# Patient Record
Sex: FEMALE | Race: ASIAN | NOT HISPANIC OR LATINO | Employment: OTHER | URBAN - METROPOLITAN AREA
[De-identification: names, ages, dates, MRNs, and addresses within clinical notes are randomized per-mention and may not be internally consistent; named-entity substitution may affect disease eponyms.]

---

## 2017-07-09 ENCOUNTER — HOSPITAL ENCOUNTER (OUTPATIENT)
Facility: MEDICAL CENTER | Age: 82
End: 2017-07-11
Attending: EMERGENCY MEDICINE | Admitting: INTERNAL MEDICINE
Payer: MEDICARE

## 2017-07-09 ENCOUNTER — APPOINTMENT (OUTPATIENT)
Dept: RADIOLOGY | Facility: MEDICAL CENTER | Age: 82
End: 2017-07-09
Attending: EMERGENCY MEDICINE
Payer: MEDICARE

## 2017-07-09 ENCOUNTER — RESOLUTE PROFESSIONAL BILLING HOSPITAL PROF FEE (OUTPATIENT)
Dept: HOSPITALIST | Facility: MEDICAL CENTER | Age: 82
End: 2017-07-09
Payer: MEDICARE

## 2017-07-09 DIAGNOSIS — R73.9 HYPERGLYCEMIA: ICD-10-CM

## 2017-07-09 DIAGNOSIS — R11.2 NAUSEA AND VOMITING, INTRACTABILITY OF VOMITING NOT SPECIFIED, UNSPECIFIED VOMITING TYPE: ICD-10-CM

## 2017-07-09 DIAGNOSIS — R41.82 ALTERED MENTAL STATUS, UNSPECIFIED ALTERED MENTAL STATUS TYPE: ICD-10-CM

## 2017-07-09 PROBLEM — R27.0 ATAXIA: Status: ACTIVE | Noted: 2017-07-09

## 2017-07-09 PROBLEM — R41.0 CONFUSION: Status: ACTIVE | Noted: 2017-07-09

## 2017-07-09 LAB
ALBUMIN SERPL BCP-MCNC: 4 G/DL (ref 3.2–4.9)
ALBUMIN/GLOB SERPL: 1.2 G/DL
ALP SERPL-CCNC: 71 U/L (ref 30–99)
ALT SERPL-CCNC: 16 U/L (ref 2–50)
ANION GAP SERPL CALC-SCNC: 10 MMOL/L (ref 0–11.9)
APPEARANCE UR: CLEAR
APTT PPP: 24.3 SEC (ref 24.7–36)
AST SERPL-CCNC: 14 U/L (ref 12–45)
BASOPHILS # BLD AUTO: 1.1 % (ref 0–1.8)
BASOPHILS # BLD: 0.11 K/UL (ref 0–0.12)
BILIRUB SERPL-MCNC: 0.6 MG/DL (ref 0.1–1.5)
BILIRUB UR QL STRIP.AUTO: NEGATIVE
BNP SERPL-MCNC: 525 PG/ML (ref 0–100)
BUN SERPL-MCNC: 34 MG/DL (ref 8–22)
CALCIUM SERPL-MCNC: 9 MG/DL (ref 8.5–10.5)
CHLORIDE SERPL-SCNC: 106 MMOL/L (ref 96–112)
CO2 SERPL-SCNC: 22 MMOL/L (ref 20–33)
COLOR UR: YELLOW
CREAT SERPL-MCNC: 1.02 MG/DL (ref 0.5–1.4)
CULTURE IF INDICATED INDCX: NO UA CULTURE
EOSINOPHIL # BLD AUTO: 0.26 K/UL (ref 0–0.51)
EOSINOPHIL NFR BLD: 2.5 % (ref 0–6.9)
ERYTHROCYTE [DISTWIDTH] IN BLOOD BY AUTOMATED COUNT: 46.6 FL (ref 35.9–50)
EST. AVERAGE GLUCOSE BLD GHB EST-MCNC: 177 MG/DL
GFR SERPL CREATININE-BSD FRML MDRD: 52 ML/MIN/1.73 M 2
GLOBULIN SER CALC-MCNC: 3.3 G/DL (ref 1.9–3.5)
GLUCOSE BLD-MCNC: 200 MG/DL (ref 65–99)
GLUCOSE BLD-MCNC: 220 MG/DL (ref 65–99)
GLUCOSE BLD-MCNC: 287 MG/DL (ref 65–99)
GLUCOSE SERPL-MCNC: 260 MG/DL (ref 65–99)
GLUCOSE UR STRIP.AUTO-MCNC: NEGATIVE MG/DL
HBA1C MFR BLD: 7.8 % (ref 0–5.6)
HCT VFR BLD AUTO: 33.3 % (ref 37–47)
HGB BLD-MCNC: 10.8 G/DL (ref 12–16)
IMM GRANULOCYTES # BLD AUTO: 0.03 K/UL (ref 0–0.11)
IMM GRANULOCYTES NFR BLD AUTO: 0.3 % (ref 0–0.9)
INR PPP: 0.9 (ref 0.87–1.13)
KETONES UR STRIP.AUTO-MCNC: NEGATIVE MG/DL
LEUKOCYTE ESTERASE UR QL STRIP.AUTO: NEGATIVE
LIPASE SERPL-CCNC: 20 U/L (ref 11–82)
LYMPHOCYTES # BLD AUTO: 2.12 K/UL (ref 1–4.8)
LYMPHOCYTES NFR BLD: 20.4 % (ref 22–41)
MCH RBC QN AUTO: 29 PG (ref 27–33)
MCHC RBC AUTO-ENTMCNC: 32.4 G/DL (ref 33.6–35)
MCV RBC AUTO: 89.5 FL (ref 81.4–97.8)
MICRO URNS: NORMAL
MONOCYTES # BLD AUTO: 0.57 K/UL (ref 0–0.85)
MONOCYTES NFR BLD AUTO: 5.5 % (ref 0–13.4)
NEUTROPHILS # BLD AUTO: 7.32 K/UL (ref 2–7.15)
NEUTROPHILS NFR BLD: 70.2 % (ref 44–72)
NITRITE UR QL STRIP.AUTO: NEGATIVE
NRBC # BLD AUTO: 0 K/UL
NRBC BLD AUTO-RTO: 0 /100 WBC
PH UR STRIP.AUTO: 6 [PH]
PLATELET # BLD AUTO: 207 K/UL (ref 164–446)
PMV BLD AUTO: 10.6 FL (ref 9–12.9)
POTASSIUM SERPL-SCNC: 4.3 MMOL/L (ref 3.6–5.5)
PROT SERPL-MCNC: 7.3 G/DL (ref 6–8.2)
PROT UR QL STRIP: NEGATIVE MG/DL
PROTHROMBIN TIME: 12.4 SEC (ref 12–14.6)
RBC # BLD AUTO: 3.72 M/UL (ref 4.2–5.4)
RBC UR QL AUTO: NEGATIVE
SODIUM SERPL-SCNC: 138 MMOL/L (ref 135–145)
SP GR UR STRIP.AUTO: 1.01
TROPONIN I SERPL-MCNC: <0.01 NG/ML (ref 0–0.04)
TROPONIN I SERPL-MCNC: <0.01 NG/ML (ref 0–0.04)
WBC # BLD AUTO: 10.4 K/UL (ref 4.8–10.8)

## 2017-07-09 PROCEDURE — 82962 GLUCOSE BLOOD TEST: CPT

## 2017-07-09 PROCEDURE — 84484 ASSAY OF TROPONIN QUANT: CPT | Mod: 91

## 2017-07-09 PROCEDURE — 96361 HYDRATE IV INFUSION ADD-ON: CPT | Mod: XU

## 2017-07-09 PROCEDURE — G0378 HOSPITAL OBSERVATION PER HR: HCPCS

## 2017-07-09 PROCEDURE — 70450 CT HEAD/BRAIN W/O DYE: CPT

## 2017-07-09 PROCEDURE — 700117 HCHG RX CONTRAST REV CODE 255: Performed by: EMERGENCY MEDICINE

## 2017-07-09 PROCEDURE — 85610 PROTHROMBIN TIME: CPT

## 2017-07-09 PROCEDURE — 36415 COLL VENOUS BLD VENIPUNCTURE: CPT

## 2017-07-09 PROCEDURE — A9270 NON-COVERED ITEM OR SERVICE: HCPCS | Performed by: INTERNAL MEDICINE

## 2017-07-09 PROCEDURE — 85025 COMPLETE CBC W/AUTO DIFF WBC: CPT

## 2017-07-09 PROCEDURE — 700105 HCHG RX REV CODE 258: Performed by: INTERNAL MEDICINE

## 2017-07-09 PROCEDURE — 700102 HCHG RX REV CODE 250 W/ 637 OVERRIDE(OP): Performed by: INTERNAL MEDICINE

## 2017-07-09 PROCEDURE — 80053 COMPREHEN METABOLIC PANEL: CPT

## 2017-07-09 PROCEDURE — 71010 DX-CHEST-PORTABLE (1 VIEW): CPT

## 2017-07-09 PROCEDURE — 303105 HCHG CATHETER EXTRA

## 2017-07-09 PROCEDURE — 81003 URINALYSIS AUTO W/O SCOPE: CPT

## 2017-07-09 PROCEDURE — 99220 PR INITIAL OBSERVATION CARE,LEVL III: CPT | Performed by: INTERNAL MEDICINE

## 2017-07-09 PROCEDURE — 96375 TX/PRO/DX INJ NEW DRUG ADDON: CPT | Mod: XU

## 2017-07-09 PROCEDURE — 83880 ASSAY OF NATRIURETIC PEPTIDE: CPT

## 2017-07-09 PROCEDURE — 96374 THER/PROPH/DIAG INJ IV PUSH: CPT | Mod: XU

## 2017-07-09 PROCEDURE — 85730 THROMBOPLASTIN TIME PARTIAL: CPT

## 2017-07-09 PROCEDURE — 99285 EMERGENCY DEPT VISIT HI MDM: CPT

## 2017-07-09 PROCEDURE — 83036 HEMOGLOBIN GLYCOSYLATED A1C: CPT

## 2017-07-09 PROCEDURE — 83690 ASSAY OF LIPASE: CPT

## 2017-07-09 PROCEDURE — 71275 CT ANGIOGRAPHY CHEST: CPT

## 2017-07-09 PROCEDURE — 94760 N-INVAS EAR/PLS OXIMETRY 1: CPT

## 2017-07-09 PROCEDURE — 93005 ELECTROCARDIOGRAM TRACING: CPT | Performed by: EMERGENCY MEDICINE

## 2017-07-09 PROCEDURE — 700105 HCHG RX REV CODE 258: Performed by: EMERGENCY MEDICINE

## 2017-07-09 PROCEDURE — 74177 CT ABD & PELVIS W/CONTRAST: CPT

## 2017-07-09 PROCEDURE — 700111 HCHG RX REV CODE 636 W/ 250 OVERRIDE (IP): Mod: JW | Performed by: EMERGENCY MEDICINE

## 2017-07-09 PROCEDURE — 51702 INSERT TEMP BLADDER CATH: CPT

## 2017-07-09 PROCEDURE — 87040 BLOOD CULTURE FOR BACTERIA: CPT | Mod: 91

## 2017-07-09 RX ORDER — PANTOPRAZOLE SODIUM 20 MG/1
20 TABLET, DELAYED RELEASE ORAL DAILY
Status: DISCONTINUED | OUTPATIENT
Start: 2017-07-09 | End: 2017-07-09

## 2017-07-09 RX ORDER — NITROGLYCERIN 0.1MG/HR
1 PATCH, TRANSDERMAL 24 HOURS TRANSDERMAL DAILY
Status: DISCONTINUED | OUTPATIENT
Start: 2017-07-09 | End: 2017-07-09

## 2017-07-09 RX ORDER — CARVEDILOL 25 MG/1
25 TABLET ORAL 2 TIMES DAILY WITH MEALS
COMMUNITY

## 2017-07-09 RX ORDER — AMOXICILLIN 250 MG
2 CAPSULE ORAL 2 TIMES DAILY
Status: DISCONTINUED | OUTPATIENT
Start: 2017-07-10 | End: 2017-07-11 | Stop reason: HOSPADM

## 2017-07-09 RX ORDER — TRAZODONE HYDROCHLORIDE 50 MG/1
150 TABLET ORAL NIGHTLY
Status: DISCONTINUED | OUTPATIENT
Start: 2017-07-09 | End: 2017-07-09

## 2017-07-09 RX ORDER — OMEPRAZOLE 20 MG/1
20 CAPSULE, DELAYED RELEASE ORAL DAILY
Status: DISCONTINUED | OUTPATIENT
Start: 2017-07-10 | End: 2017-07-11 | Stop reason: HOSPADM

## 2017-07-09 RX ORDER — BISACODYL 10 MG
10 SUPPOSITORY, RECTAL RECTAL
Status: DISCONTINUED | OUTPATIENT
Start: 2017-07-09 | End: 2017-07-11 | Stop reason: HOSPADM

## 2017-07-09 RX ORDER — ONDANSETRON 2 MG/ML
4 INJECTION INTRAMUSCULAR; INTRAVENOUS ONCE
Status: COMPLETED | OUTPATIENT
Start: 2017-07-09 | End: 2017-07-09

## 2017-07-09 RX ORDER — DIPHENHYDRAMINE HYDROCHLORIDE 50 MG/ML
INJECTION INTRAMUSCULAR; INTRAVENOUS
Status: DISPENSED
Start: 2017-07-09 | End: 2017-07-09

## 2017-07-09 RX ORDER — GABAPENTIN 600 MG/1
600 TABLET ORAL 3 TIMES DAILY
COMMUNITY

## 2017-07-09 RX ORDER — MIDAZOLAM HYDROCHLORIDE 1 MG/ML
2 INJECTION INTRAMUSCULAR; INTRAVENOUS ONCE
Status: DISCONTINUED | OUTPATIENT
Start: 2017-07-09 | End: 2017-07-09

## 2017-07-09 RX ORDER — ISOSORBIDE MONONITRATE 30 MG/1
30 TABLET, EXTENDED RELEASE ORAL EVERY MORNING
COMMUNITY

## 2017-07-09 RX ORDER — SODIUM CHLORIDE 9 MG/ML
INJECTION, SOLUTION INTRAVENOUS CONTINUOUS
Status: DISCONTINUED | OUTPATIENT
Start: 2017-07-09 | End: 2017-07-11 | Stop reason: HOSPADM

## 2017-07-09 RX ORDER — ASPIRIN 325 MG
325 TABLET ORAL DAILY
Status: DISCONTINUED | OUTPATIENT
Start: 2017-07-09 | End: 2017-07-11 | Stop reason: HOSPADM

## 2017-07-09 RX ORDER — ASPIRIN 300 MG/1
300 SUPPOSITORY RECTAL DAILY
Status: DISCONTINUED | OUTPATIENT
Start: 2017-07-09 | End: 2017-07-11 | Stop reason: HOSPADM

## 2017-07-09 RX ORDER — GLIPIZIDE 5 MG/1
10 TABLET ORAL EVERY MORNING
Status: DISCONTINUED | OUTPATIENT
Start: 2017-07-10 | End: 2017-07-11 | Stop reason: HOSPADM

## 2017-07-09 RX ORDER — NITROGLYCERIN 0.4 MG/1
0.4 TABLET SUBLINGUAL PRN
Status: DISCONTINUED | OUTPATIENT
Start: 2017-07-09 | End: 2017-07-09

## 2017-07-09 RX ORDER — CLONAZEPAM 0.5 MG/1
0.5 TABLET ORAL ONCE
Status: COMPLETED | OUTPATIENT
Start: 2017-07-09 | End: 2017-07-09

## 2017-07-09 RX ORDER — DEXTROSE MONOHYDRATE 25 G/50ML
25 INJECTION, SOLUTION INTRAVENOUS
Status: DISCONTINUED | OUTPATIENT
Start: 2017-07-09 | End: 2017-07-11 | Stop reason: HOSPADM

## 2017-07-09 RX ORDER — HYDRALAZINE HYDROCHLORIDE 10 MG/1
30 TABLET, FILM COATED ORAL 3 TIMES DAILY
Status: DISCONTINUED | OUTPATIENT
Start: 2017-07-10 | End: 2017-07-11 | Stop reason: HOSPADM

## 2017-07-09 RX ORDER — DEXAMETHASONE SODIUM PHOSPHATE 10 MG/ML
10 INJECTION, SOLUTION INTRAMUSCULAR; INTRAVENOUS ONCE
Status: COMPLETED | OUTPATIENT
Start: 2017-07-09 | End: 2017-07-09

## 2017-07-09 RX ORDER — CLOPIDOGREL BISULFATE 75 MG/1
75 TABLET ORAL DAILY
Status: DISCONTINUED | OUTPATIENT
Start: 2017-07-09 | End: 2017-07-09

## 2017-07-09 RX ORDER — FUROSEMIDE 10 MG/ML
20 INJECTION INTRAMUSCULAR; INTRAVENOUS ONCE
Status: COMPLETED | OUTPATIENT
Start: 2017-07-09 | End: 2017-07-09

## 2017-07-09 RX ORDER — LEVALBUTEROL TARTRATE 45 UG/1
1-2 AEROSOL, METERED ORAL EVERY 4 HOURS PRN
Status: DISCONTINUED | OUTPATIENT
Start: 2017-07-09 | End: 2017-07-09

## 2017-07-09 RX ORDER — ACETAMINOPHEN 325 MG/1
650 TABLET ORAL EVERY 6 HOURS PRN
Status: DISCONTINUED | OUTPATIENT
Start: 2017-07-09 | End: 2017-07-11 | Stop reason: HOSPADM

## 2017-07-09 RX ORDER — POLYETHYLENE GLYCOL 3350 17 G/17G
1 POWDER, FOR SOLUTION ORAL
Status: DISCONTINUED | OUTPATIENT
Start: 2017-07-09 | End: 2017-07-11 | Stop reason: HOSPADM

## 2017-07-09 RX ORDER — ATORVASTATIN CALCIUM 20 MG/1
20 TABLET, FILM COATED ORAL NIGHTLY
Status: DISCONTINUED | OUTPATIENT
Start: 2017-07-09 | End: 2017-07-09

## 2017-07-09 RX ORDER — HYDRALAZINE HYDROCHLORIDE 10 MG/1
30 TABLET, FILM COATED ORAL 3 TIMES DAILY
COMMUNITY

## 2017-07-09 RX ORDER — PANTOPRAZOLE SODIUM 20 MG/1
20 TABLET, DELAYED RELEASE ORAL DAILY
COMMUNITY

## 2017-07-09 RX ORDER — ISOSORBIDE MONONITRATE 60 MG/1
30 TABLET, EXTENDED RELEASE ORAL EVERY MORNING
Status: DISCONTINUED | OUTPATIENT
Start: 2017-07-10 | End: 2017-07-11 | Stop reason: HOSPADM

## 2017-07-09 RX ORDER — ASPIRIN 81 MG/1
81 TABLET, CHEWABLE ORAL DAILY
COMMUNITY

## 2017-07-09 RX ORDER — DIPHENHYDRAMINE HYDROCHLORIDE 50 MG/ML
25 INJECTION INTRAMUSCULAR; INTRAVENOUS ONCE
Status: COMPLETED | OUTPATIENT
Start: 2017-07-09 | End: 2017-07-09

## 2017-07-09 RX ORDER — GLIPIZIDE 5 MG/1
5 TABLET ORAL EVERY EVENING
Status: DISCONTINUED | OUTPATIENT
Start: 2017-07-10 | End: 2017-07-11 | Stop reason: HOSPADM

## 2017-07-09 RX ORDER — ONDANSETRON 2 MG/ML
4 INJECTION INTRAMUSCULAR; INTRAVENOUS ONCE
Status: ACTIVE | OUTPATIENT
Start: 2017-07-09 | End: 2017-07-10

## 2017-07-09 RX ORDER — ATORVASTATIN CALCIUM 40 MG/1
10 TABLET, FILM COATED ORAL DAILY
COMMUNITY

## 2017-07-09 RX ORDER — ASPIRIN 81 MG/1
324 TABLET, CHEWABLE ORAL DAILY
Status: DISCONTINUED | OUTPATIENT
Start: 2017-07-09 | End: 2017-07-11 | Stop reason: HOSPADM

## 2017-07-09 RX ORDER — ATORVASTATIN CALCIUM 80 MG/1
80 TABLET, FILM COATED ORAL
Status: DISCONTINUED | OUTPATIENT
Start: 2017-07-09 | End: 2017-07-11 | Stop reason: HOSPADM

## 2017-07-09 RX ORDER — ASPIRIN 81 MG/1
81 TABLET, CHEWABLE ORAL DAILY
Status: DISCONTINUED | OUTPATIENT
Start: 2017-07-09 | End: 2017-07-09

## 2017-07-09 RX ORDER — HALOPERIDOL 5 MG/ML
2 INJECTION INTRAMUSCULAR ONCE
Status: DISCONTINUED | OUTPATIENT
Start: 2017-07-09 | End: 2017-07-09

## 2017-07-09 RX ORDER — CARVEDILOL 6.25 MG/1
25 TABLET ORAL 2 TIMES DAILY WITH MEALS
Status: DISCONTINUED | OUTPATIENT
Start: 2017-07-09 | End: 2017-07-11 | Stop reason: HOSPADM

## 2017-07-09 RX ORDER — BACLOFEN 10 MG/1
10 TABLET ORAL 3 TIMES DAILY
Status: DISCONTINUED | OUTPATIENT
Start: 2017-07-09 | End: 2017-07-11 | Stop reason: HOSPADM

## 2017-07-09 RX ORDER — SODIUM CHLORIDE 9 MG/ML
1000 INJECTION, SOLUTION INTRAVENOUS ONCE
Status: COMPLETED | OUTPATIENT
Start: 2017-07-09 | End: 2017-07-09

## 2017-07-09 RX ORDER — BACLOFEN 10 MG/1
10 TABLET ORAL 3 TIMES DAILY
COMMUNITY

## 2017-07-09 RX ORDER — METOPROLOL TARTRATE 50 MG/1
50 TABLET, FILM COATED ORAL 2 TIMES DAILY
Status: DISCONTINUED | OUTPATIENT
Start: 2017-07-09 | End: 2017-07-09

## 2017-07-09 RX ADMIN — SODIUM CHLORIDE: 9 INJECTION, SOLUTION INTRAVENOUS at 18:55

## 2017-07-09 RX ADMIN — ACETAMINOPHEN 650 MG: 325 TABLET, FILM COATED ORAL at 19:24

## 2017-07-09 RX ADMIN — INSULIN LISPRO 3 UNITS: 100 INJECTION, SOLUTION INTRAVENOUS; SUBCUTANEOUS at 18:37

## 2017-07-09 RX ADMIN — INSULIN LISPRO 1 UNITS: 100 INJECTION, SOLUTION INTRAVENOUS; SUBCUTANEOUS at 21:05

## 2017-07-09 RX ADMIN — FUROSEMIDE 20 MG: 10 INJECTION, SOLUTION INTRAMUSCULAR; INTRAVENOUS at 12:19

## 2017-07-09 RX ADMIN — ONDANSETRON 4 MG: 2 INJECTION INTRAMUSCULAR; INTRAVENOUS at 10:10

## 2017-07-09 RX ADMIN — DIPHENHYDRAMINE HYDROCHLORIDE 25 MG: 50 INJECTION, SOLUTION INTRAMUSCULAR; INTRAVENOUS at 10:10

## 2017-07-09 RX ADMIN — CLONAZEPAM 0.5 MG: 0.5 TABLET ORAL at 20:58

## 2017-07-09 RX ADMIN — SODIUM CHLORIDE 1000 ML: 900 INJECTION, SOLUTION INTRAVENOUS at 10:10

## 2017-07-09 RX ADMIN — IOHEXOL 100 ML: 350 INJECTION, SOLUTION INTRAVENOUS at 11:44

## 2017-07-09 RX ADMIN — DEXAMETHASONE SODIUM PHOSPHATE 10 MG: 10 INJECTION, SOLUTION INTRAMUSCULAR; INTRAVENOUS at 10:29

## 2017-07-09 RX ADMIN — BACLOFEN 10 MG: 10 TABLET ORAL at 22:31

## 2017-07-09 RX ADMIN — CARVEDILOL 25 MG: 6.25 TABLET, FILM COATED ORAL at 18:55

## 2017-07-09 RX ADMIN — ATORVASTATIN CALCIUM 80 MG: 80 TABLET, FILM COATED ORAL at 19:24

## 2017-07-09 ASSESSMENT — COPD QUESTIONNAIRES
HAVE YOU SMOKED AT LEAST 100 CIGARETTES IN YOUR ENTIRE LIFE: NO/DON'T KNOW
DO YOU EVER COUGH UP ANY MUCUS OR PHLEGM?: NO/ONLY WITH OCCASIONAL COLDS OR INFECTIONS
COPD SCREENING SCORE: 2
DURING THE PAST 4 WEEKS HOW MUCH DID YOU FEEL SHORT OF BREATH: NONE/LITTLE OF THE TIME

## 2017-07-09 ASSESSMENT — LIFESTYLE VARIABLES
ALCOHOL_USE: NO
EVER_SMOKED: NEVER
EVER_SMOKED: NEVER
DO YOU DRINK ALCOHOL: NO

## 2017-07-09 ASSESSMENT — PAIN SCALES - GENERAL: PAINLEVEL_OUTOF10: 0

## 2017-07-09 NOTE — IP AVS SNAPSHOT
Home Care Instructions                                                                                                                  Name:Yi Nix  Medical Record Number:[id number]  CSN: [id number]    YOB: 1935   Age: 82 y.o.  Sex: female  HT:1.524 m (5') WT: 63.504 kg (140 lb)          Admit Date: 7/9/2017     Discharge Date:   Today's Date: 7/11/2017  Attending Doctor:  Mitchell Dawkins M.D.                  Allergies:  Phenergan with dextromethorphan; Food; Codeine; Contrast media with iodine; Lactose; Sulfa drugs; and Vicodin            Discharge Instructions       Discharge Instructions    Discharged to home by car with relative. Discharged via wheelchair, hospital escort: Yes.  Special equipment needed: Not Applicable    Be sure to schedule a follow-up appointment with your primary care doctor or any specialists as instructed.     Discharge Plan:   Influenza Vaccine Indication: Not indicated: Previously immunized this influenza season and > 8 years of age    I understand that a diet low in cholesterol, fat, and sodium is recommended for good health. Unless I have been given specific instructions below for another diet, I accept this instruction as my diet prescription.   Other diet: Diabetic      Special Instructions: None    · Is patient discharged on Warfarin / Coumadin?   No     · Is patient Post Blood Transfusion?  No    Depression / Suicide Risk    As you are discharged from this [facility] facility, it is important to learn how to keep safe from harming yourself.    Recognize the warning signs:  · Abrupt changes in personality, positive or negative- including increase in energy   · Giving away possessions  · Change in eating patterns- significant weight changes-  positive or negative  · Change in sleeping patterns- unable to sleep or sleeping all the time   · Unwillingness or inability to communicate  · Depression  · Unusual sadness, discouragement and loneliness  · Talk of wanting to  die  · Neglect of personal appearance   · Rebelliousness- reckless behavior  · Withdrawal from people/activities they love  · Confusion- inability to concentrate     If you or a loved one observes any of these behaviors or has concerns about self-harm, here's what you can do:  · Talk about it- your feelings and reasons for harming yourself  · Remove any means that you might use to hurt yourself (examples: pills, rope, extension cords, firearm)  · Get professional help from the community (Mental Health, Substance Abuse, psychological counseling)  · Do not be alone:Call your Safe Contact- someone whom you trust who will be there for you.  · Call your local CRISIS HOTLINE 122-0572 or 576-852-3976  · Call your local Children's Mobile Crisis Response Team [place] (178) 224-2371 or www.[employer]  · Call the toll free National Suicide Prevention Hotlines   · National Suicide Prevention Lifeline 024-544-FUVM (4099)  · [place] [facility] 800-SUICIDE (863-5345)           Discharge Medication Instructions:    Below are the medications your physician expects you to take upon discharge:    Review all your home medications and newly ordered medications with your doctor and/or pharmacist. Follow medication instructions as directed by your doctor and/or pharmacist.    Please keep your medication list with you and share with your physician.               Medication List      START taking these medications        Instructions    Morning Afternoon Evening Bedtime    ondansetron 4 MG Tbdp   Commonly known as:  ZOFRAN ODT   Next Dose Due:  7/11/17 11pm        Take 1 Tab by mouth every 8 hours as needed for Nausea/Vomiting for up to 5 days.   Dose:  4 mg                          CONTINUE taking these medications        Instructions    Morning Afternoon Evening Bedtime    aspirin 81 MG Chew chewable tablet   Commonly known as:  ASA   Next Dose Due:  7/12/17        Take 81 mg by mouth every day.   Dose:  81 mg                         atorvastatin 40 MG Tabs   Last time this was given:  80 mg on 7/10/2017  8:04 PM   Commonly known as:  LIPITOR   Next Dose Due:  7/11/17 9pm        Take 10 mg by mouth every day.   Dose:  10 mg                        baclofen 10 MG Tabs   Last time this was given:  10 mg on 7/11/2017  8:55 AM   Commonly known as:  LIORESAL   Next Dose Due:  7/11/17 9pm        Take 10 mg by mouth 3 times a day.   Dose:  10 mg                        carvedilol 25 MG Tabs   Last time this was given:  25 mg on 7/11/2017  8:55 AM   Commonly known as:  COREG   Next Dose Due:  7/11/17 5pm          Take 25 mg by mouth 2 times a day, with meals.   Dose:  25 mg                        coenzyme Q-10 30 MG capsule   Next Dose Due:  7/11/17        Take 30 mg by mouth every day.   Dose:  30 mg                        gabapentin 600 MG tablet   Commonly known as:  NEURONTIN   Next Dose Due:  7/11/17 9pm          Take 600 mg by mouth 3 times a day.   Dose:  600 mg                        glipiZIDE 10 MG Tabs   Last time this was given:  10 mg on 7/11/2017  8:55 AM   Commonly known as:  GLUCOTROL   Next Dose Due:  7/11/17 5pm        Take 5-10 mg by mouth 2 times a day. 10 mg AM 5 mg PM   Dose:  5-10 mg                        hydrALAZINE 10 MG Tabs   Last time this was given:  30 mg on 7/11/2017  6:11 AM   Commonly known as:  APRESOLINE   Next Dose Due:  711/17 9pm          Take 30 mg by mouth 3 times a day.   Dose:  30 mg                        isosorbide mononitrate SR 30 MG Tb24   Last time this was given:  30 mg on 7/11/2017  8:55 AM   Commonly known as:  IMDUR   Next Dose Due:  7/12/17        Take 30 mg by mouth every morning.   Dose:  30 mg                        pantoprazole 20 MG tablet   Commonly known as:  PROTONIX   Next Dose Due:  7/12/17        Take 20 mg by mouth every day.   Dose:  20 mg                             Where to Get Your Medications      Information about where to get these medications is not yet available     !  Ask your nurse or doctor about these medications    - ondansetron 4 MG Tbdp            Instructions           Diet / Nutrition:    Follow any diet instructions given to you by your doctor or the dietician, including how much salt (sodium) you are allowed each day.    If you are overweight, talk to your doctor about a weight reduction plan.    Activity:    Remain physically active following your doctor's instructions about exercise and activity.    Rest often.     Any time you become even a little tired or short of breath, SIT DOWN and rest.    Worsening Symptoms:    Report any of the following signs and symptoms to the doctor's office immediately:    *Pain of jaw, arm, or neck  *Chest pain not relieved by medication                               *Dizziness or loss of consciousness  *Difficulty breathing even when at rest   *More tired than usual                                       *Bleeding drainage or swelling of surgical site  *Swelling of feet, ankles, legs or stomach                 *Fever (>100ºF)  *Pink or blood tinged sputum  *Weight gain (3lbs/day or 5lbs /week)           *Shock from internal defibrillator (if applicable)  *Palpitations or irregular heartbeats                *Cool and/or numb extremities    Stroke Awareness    Common Risk Factors for Stroke include:    Age  Atrial Fibrillation  Carotid Artery Stenosis  Diabetes Mellitus  Excessive alcohol consumption  High blood pressure  Overweight   Physical inactivity  Smoking    Warning signs and symptoms of a stroke include:    *Sudden numbness or weakness of the face, arm or leg (especially on one side of the body).  *Sudden confusion, trouble speaking or understanding.  *Sudden trouble seeing in one or both eyes.  *Sudden trouble walking, dizziness, loss of balance or coordination.Sudden severe headache with no known cause.    It is very important to get treatment quickly when a stroke occurs. If you experience any of the above warning signs, call  911 immediately.                   Disclaimer         Quit Smoking / Tobacco Use:    I understand the use of any tobacco products increases my chance of suffering from future heart disease or stroke and could cause other illnesses which may shorten my life. Quitting the use of tobacco products is the single most important thing I can do to improve my health. For further information on smoking / tobacco cessation call a Toll Free Quit Line at 1-964.996.3755 (*National Cancer [place]) or 1-330.677.8675 (American Lung Association) or you can access the web based program at www.lungusa.org.    [place] Tobacco Users Help Line:  (670) 944-3405       Toll Free: 1-328.665.9929  Quit Tobacco Program [facility] (910)227-5040    Crisis Hotline:    [place] Crisis Hotline:  5-706-RHQZIOR or 1-841.906.6552    [place] Crisis Hotline:    1-980.803.7052 or 067-595-3385    Discharge Survey:   Thank you for choosing [facility]. We hope we did everything we could to make your hospital stay a pleasant one. You may be receiving a phone survey and we would appreciate your time and participation in answering the questions. Your input is very valuable to us in our efforts to improve our service to our patients and their families.        My signature on this form indicates that:    1. I have reviewed and understand the above information.  2. My questions regarding this information have been answered to my satisfaction.  3. I have formulated a plan with my discharge nurse to obtain my prescribed medications for home.                  Disclaimer         __________________________________                     __________       ________                       Patient Signature                                                 Date                    Time

## 2017-07-09 NOTE — IP AVS SNAPSHOT
Envoimoinscher Access Code: [email or web address]  Expires: 8/10/2017  2:20 PM    Envoimoinscher  A secure, online tool to manage your health information     [employer] Envoimoinscher® is a secure, online tool that connects you to your personalized health information from the privacy of your home -- day or night - making it very easy for you to manage your healthcare. Once the activation process is completed, you can even access your medical information using the Envoimoinscher cathy, which is available for free in the Apple Cathy store or Google Play store.     Envoimoinscher provides the following levels of access (as shown below):   My Chart Features   [facility] Primary Care Doctor [facility]  Specialists [facility]  Urgent  Care Non-[facility]  Primary Care  Doctor   Email your healthcare team securely and privately 24/7 X X X X   Manage appointments: schedule your next appointment; view details of past/upcoming appointments X      Request prescription refills. X      View recent personal medical records, including lab and immunizations X X X X   View health record, including health history, allergies, medications X X X X   Read reports about your outpatient visits, procedures, consult and ER notes X X X X   See your discharge summary, which is a recap of your hospital and/or ER visit that includes your diagnosis, lab results, and care plan. X X       How to register for Envoimoinscher:  1. Go to  [email or web address].  2. Click on the Sign Up Now box, which takes you to the New Member Sign Up page. You will need to provide the following information:  a. Enter your Envoimoinscher Access Code exactly as it appears at the top of this page. (You will not need to use this code after you’ve completed the sign-up process. If you do not sign up before the expiration date, you must request a new code.)   b. Enter your date of birth.   c. Enter your home email address.   d. Click Submit, and follow the next screen’s instructions.  3. Create a Envoimoinscher ID. This will be your Envoimoinscher  login ID and cannot be changed, so think of one that is secure and easy to remember.  4. Create a "[employer] password. You can change your password at any time.  5. Enter your Password Reset Question and Answer. This can be used at a later time if you forget your password.   6. Enter your e-mail address. This allows you to receive e-mail notifications when new information is available in "[employer].  7. Click Sign Up. You can now view your health information.    For assistance activating your "[employer] account, call [phone]

## 2017-07-09 NOTE — ED NOTES
Pt BIB EMS from for ALOC. Spouse reports pt was c/o leg cramping yesterday and awoke today alerted with N/V. Pt drowsy but easily awaken. Pt alert to self and place.

## 2017-07-09 NOTE — IP AVS SNAPSHOT
" <p align=\"LEFT\"><IMG SRC=\"//[email or web address]\" alt=\"Image\" WIDTH=\"50%\" HEIGHT=\"200\" BORDER=\"\"></p>                   Name:Yi Nix  Medical Record Number:[id number]  CSN: [id number]    YOB: 1935   Age: 82 y.o.  Sex: female  HT:1.524 m (5') WT: 63.504 kg (140 lb)          Admit Date: 7/9/2017     Discharge Date:   Today's Date: 7/11/2017  Attending Doctor:  Mitchell Dawkins M.D.                  Allergies:  Phenergan with dextromethorphan; Food; Codeine; Contrast media with iodine; Lactose; Sulfa drugs; and Vicodin             Medication List      Take these Medications        Instructions    aspirin 81 MG Chew chewable tablet   Commonly known as:  ASA    Take 81 mg by mouth every day.   Dose:  81 mg       atorvastatin 40 MG Tabs   Commonly known as:  LIPITOR    Take 10 mg by mouth every day.   Dose:  10 mg       baclofen 10 MG Tabs   Commonly known as:  LIORESAL    Take 10 mg by mouth 3 times a day.   Dose:  10 mg       carvedilol 25 MG Tabs   Commonly known as:  COREG    Take 25 mg by mouth 2 times a day, with meals.   Dose:  25 mg       coenzyme Q-10 30 MG capsule    Take 30 mg by mouth every day.   Dose:  30 mg       gabapentin 600 MG tablet   Commonly known as:  NEURONTIN    Take 600 mg by mouth 3 times a day.   Dose:  600 mg       glipiZIDE 10 MG Tabs   Commonly known as:  GLUCOTROL    Take 5-10 mg by mouth 2 times a day. 10 mg AM 5 mg PM   Dose:  5-10 mg       hydrALAZINE 10 MG Tabs   Commonly known as:  APRESOLINE    Take 30 mg by mouth 3 times a day.   Dose:  30 mg       isosorbide mononitrate SR 30 MG Tb24   Commonly known as:  IMDUR    Take 30 mg by mouth every morning.   Dose:  30 mg       ondansetron 4 MG Tbdp   Commonly known as:  ZOFRAN ODT    Take 1 Tab by mouth every 8 hours as needed for Nausea/Vomiting for up to 5 days.   Dose:  4 mg       pantoprazole 20 MG tablet   Commonly known as:  PROTONIX    Take 20 mg by mouth every day.   Dose:  20 mg         "

## 2017-07-09 NOTE — H&P
HOSPITAL MEDICINE ADMISSION NOTE    Date of Service: 2017   Name: Yi Nix   : 1935  MRN: [id number]  CSN: [id number]  Primary Care Physician: Pcp Not In Computer    Chief Complaint  Chief Complaint   Patient presents with   • Confusion, nausea, vomiting       History of Present Illness  Yi Nix is a 82 y.o. Female with history of CAD  from [place] presents last night with vomiting, acting strange, family thought she was pain though currently no tenderness on exam. ED physician gave IV benadryl to her hence she is somnolent so unable to give me a story. Family at bedside who gave me the information.  said she was confused, weird, nausea and vomiting nonbloody, not coffee grounds, bilious material. He reported no problems with bowel movement. He denied unusual food or recent travel. He did not report fever, chills, dysuria, rash, chest pain, SOB or lightheadedness. He mentioned she was ataxic.  At ED, mentation okay (but  says confused), also family reported L arm pain, but none at ED. Reportedly she was vomiting at ED and IV benadryl give. She is sedated after Zofran, Versed, Haldol and her stopped vomiting  When I saw her at the ED, no acute distress, sleeping, somnbolent, sedated. She can react to pain however but will go back to sleep.    Home Medications  No current facility-administered medications on file prior to encounter.     Current Outpatient Prescriptions on File Prior to Encounter   Medication Sig Dispense Refill   • atorvastatin (LIPITOR) 80 MG tablet Take 1 Tab by mouth every bedtime. 30 Tab 1   • clopidogrel (PLAVIX) 75 MG TABS Take 1 Tab by mouth every day. 30 Tab 2   • metoprolol (LOPRESSOR) 50 MG TABS Take 1 Tab by mouth 2 Times a Day. 60 Tab 2   • nitroglycerin (NITRODUR) 0.1 MG/HR PT24 Apply 1 Patch to skin as directed every day. Remove at night 30 Each 2   • nitroglycerin (NITROSTAT) 0.4 MG SUBL Place 1 Tab under tongue as needed for Chest Pain. 1 Each 2   •  levalbuterol (XOPENEX HFA) 45 MCG/ACT inhaler Inhale 1-2 Puffs by mouth every four hours as needed for Shortness of Breath. 1 Inhaler 3   • glipiZIDE (GLUCOTROL) 10 MG TABS Take 10 mg by mouth 2 times a day.       • metformin (GLUCOPHAGE) 500 MG TABS Take 500 mg by mouth 2 times a day, with meals.    Indications: Non-Insulin-Dependent Diabetes     • omeprazole (PRILOSEC) 20 MG CPDR Take 20 mg by mouth every day.       • trazodone (DESYREL) 150 MG TABS Take 150 mg by mouth every evening.       • aspirin (ASA) 81 MG CHEW Take 81 mg by mouth every day.           Allergies  Phenergan with dextromethorphan; Citrus; Codeine; Contrast media with iodine; Eggs; Food; Lactose; Sulfa drugs; and Vicodin    Past Medical and Surgical History  Past Medical History   Diagnosis Date   • CAD (coronary artery disease)    • Hypertension    • Diabetes (CMS-HCC)    • Hyperlipemia    • Breast cancer (CMS-HCC)      History reviewed. No pertinent past surgical history.    Family History  History reviewed. No pertinent family history.    Social History  Social History     Social History   • Marital Status:      Spouse Name: N/A   • Number of Children: N/A   • Years of Education: N/A     Occupational History   • Not on file.     Social History Main Topics   • Smoking status: Never Smoker    • Smokeless tobacco: Not on file   • Alcohol Use: No   • Drug Use: No   • Sexual Activity: Not on file     Other Topics Concern   • Not on file     Social History Narrative   • No narrative on file       Review of Systems  Review of Systems   Unable to perform ROS: medical condition     Physical Exam  Filed Vitals:    07/09/17 1014 07/09/17 1042 07/09/17 1132   BP: 174/62     Pulse: 87 77 70   Temp: 36.7 °C (98 °F)     Resp: 14 18 18   Height: 1.524 m (5')     Weight: 63.504 kg (140 lb)     SpO2:  99% 91%       Physical Exam    General/Constitutional: No acute distress. Sleeping.  Head: Normocephalic, atraumatic  ENT: Oral mucosa is moist. No  obvious pharyngeal exudates  Eyes: Pink conjunctiva, no scleral icterus  Neck: Supple, no lymphadenopathy  Cardiovascular: Normal rate and regular rhythm. S1,2 noted. No murmurs, gallops or rubs.  Pulmonary: Somewhat diminished at bases..  Abdominal: Soft, nontender, not distended, bowel sounds normoactive. No guarding or peritoneal signs.  Musculoskeletal: No tenderness to palpation of chest wall.  Neurologic: Confused, Currently asleep.Genitourinary: No gross hematuria  Skin: No obvious rash.  Psychiatric: Pleasant, cooperative.  Vitals Reviewed  Labs Reviewed  Imaging reviewed  Nursing notes reviewed    Labs  Lab Results   Component Value Date/Time    WBC 10.4 07/09/2017 10:04 AM    RBC 3.72* 07/09/2017 10:04 AM    HEMOGLOBIN 10.8* 07/09/2017 10:04 AM    HEMATOCRIT 33.3* 07/09/2017 10:04 AM    MCV 89.5 07/09/2017 10:04 AM    MCH 29.0 07/09/2017 10:04 AM    MCHC 32.4* 07/09/2017 10:04 AM    MPV 10.6 07/09/2017 10:04 AM    NEUTROPHILS-POLYS 70.20 07/09/2017 10:04 AM    LYMPHOCYTES 20.40* 07/09/2017 10:04 AM    MONOCYTES 5.50 07/09/2017 10:04 AM    EOSINOPHILS 2.50 07/09/2017 10:04 AM    BASOPHILS 1.10 07/09/2017 10:04 AM    HYPOCHROMIA 1+ 01/05/2013 04:36 AM      Lab Results   Component Value Date/Time    SODIUM 138 07/09/2017 10:04 AM    POTASSIUM 4.3 07/09/2017 10:04 AM    CHLORIDE 106 07/09/2017 10:04 AM    CO2 22 07/09/2017 10:04 AM    GLUCOSE 260* 07/09/2017 10:04 AM    BUN 34* 07/09/2017 10:04 AM    CREATININE 1.02 07/09/2017 10:04 AM      Lab Results   Component Value Date/Time    PT 12.4 07/09/2017 10:46 AM    INR 0.90 07/09/2017 10:46 AM        Imaging  Ct-abdomen-pelvis With    7/9/2017 7/9/2017 10:59 AM HISTORY/REASON FOR EXAM:  Vomiting TECHNIQUE/EXAM DESCRIPTION: CT scan of the abdomen and pelvis with contrast. Contrast-enhanced helical scanning was obtained from the diaphragmatic domes through the pubic symphysis following the bolus administration of 100 mL of Omnipaque 350 nonionic contrast without  complication. Low dose optimization technique was utilized for this CT exam including automated exposure control and adjustment of the mA and/or kV according to patient size. COMPARISON: No prior studies available. FINDINGS: CT Abdomen: The liver and spleen are unremarkable. The pancreas is unremarkable. Images are recorded by patient motion. The gallbladder demonstrates no stones. The adrenal glands are not enlarged and the kidneys enhance symmetrically. There there are areas of bilateral renal cortical thinning consistent with areas of scarring/infarction likely related to renal artery stenosis. There is no lymphadenopathy. There is coarse calcified plaque in the abdominal aorta with calcified plaque filling the infrarenal abdominal aorta consistent with occlusion which is likely chronic. The more distal abdominal aorta is opacified apparently due to collaterals. There is coarse calcified plaque in the iliac arteries with significant stenosis. The celiac axis and SMA are opacified. The bowel is unremarkable in appearance. CT Pelvis: There is no free fluid or lymphadenopathy. The appendix is nonvisualized. There is no acute inflammatory process. The uterus is enlarged and heterogeneous. The bladder is compressed by Butcher catheter.     7/9/2017  Coarse calcified plaque in the abdominal aorta with occlusion of the infrarenal abdominal aorta due to coarse calcified plaque suggesting a chronic process with opacification of the more distal abdominal aorta apparently due to collaterals. No evidence of bowel dilatation. Appendix not visualized. Enlarged, heterogeneous uterus.    Ct-head W/o    7/9/2017 7/9/2017 10:59 AM HISTORY/REASON FOR EXAM:  Altered Mental Status. TECHNIQUE/EXAM DESCRIPTION AND NUMBER OF VIEWS: CT of the head without contrast. The study was performed on a helical multidetector CT scanner. Contiguous 2.5 mm axial sections were obtained from the skull base through the vertex. Up to date radiation dose  reduction adjustments have been utilized to meet ALARA standards for radiation dose reduction. COMPARISON:  None available FINDINGS: The calvariae and skull base are unremarkable. There are no extraaxial fluid collections. The ventricular system and basal cisterns are within normal limits. There are no areas of abnormal density in the brain substance. There are no hemorrhagic lesions.  There are no mass effects or shift of midline structures. The brainstem and posterior fossa structures are unremarkable. Paranasal sinuses in the field of view are unremarkable. Mastoids in the field of view are unremarkable.     7/9/2017  Head CT without contrast within normal limits. No evidence of acute cerebral infarction, hemorrhage or mass lesion.    Dx-chest-portable (1 View)    7/9/2017 7/9/2017 11:46 AM HISTORY/REASON FOR EXAM:  Cough. TECHNIQUE/EXAM DESCRIPTION AND NUMBER OF VIEWS: Single portable view of the chest. COMPARISON: January 3, 2013 FINDINGS: The cardiac silhouette is enlarged. There is interstitial prominence. There are trace effusions. No pneumothorax.     7/9/2017  Congestive heart failure.    Ct-cta Chest Pulmonary Artery W/ Recons    7/9/2017 7/9/2017 10:59 AM HISTORY/REASON FOR EXAM:  Pain TECHNIQUE/EXAM DESCRIPTION: CT angiogram scan for pulmonary embolism with contrast, with reconstructions. 1.25 mm helical sections were obtained from the lung apices through the lung bases following the rapid bolus administration of 100 mL of Omnipaque 350 nonionic contrast. Thin-section overlapping reconstruction interval was utilized. Coronal reconstructions were generated from the axial data. MIP post processing was performed and utilized for the interpretation. Low dose optimization technique was utilized for this CT exam including automated exposure control and adjustment of the mA and/or kV according to patient size. COMPARISON: None FINDINGS: Pulmonary Embolism: No. Main Pulmonary Arteries: No. Segmental branches:  No. Subsegmental branches: No. Only if positive for PE: RV diameter: cm. LV diameter: cm. RV/LV ratio: . (Greater than 1.3 is abnormal.) Additional Comments: None. Lungs: There is interstitial prominence suggesting pulmonary edema with peribronchial thickening. There are some patchy groundglass opacities also suggestive of pulmonary edema.. Pleura: There are trace bilateral pleural effusions.. Nodes: No enlarged lymph nodes. Additional findings: .     7/9/2017  No evidence of pulmonary embolus. Pulmonary edema with trace pleural effusions.       Assessment and Plan    Toxic-metabolic encephalopathy. Might be related to agitation and distress from vomiting as mentation coming baseline but she has cognitive deficits. Currently she is sedate as she received IV benadryl from the ED physician,.    Intractable nausea and vomiting. Resolved with medications.    Anemia, mild. No bleeding. Trend H/H.    Hypertension. Uncontrolled. Continue her home antihypertensives; may not have taken it bec of vomiting.    DNR discussion. They wishthat she be DNR. She and her  has advanced directives.    I spent 73 minutes evaluating Yi Nix, reviewing the chart, vitals, labs and imaging, discussing the case with ED physician, family, medication reconciliation, placing orders and enacting the plan above.    CC Pcp Not In Computer

## 2017-07-09 NOTE — ED PROVIDER NOTES
ED Provider Note    CHIEF COMPLAINT  Chief Complaint   Patient presents with   • ALOC       HPI  Yi Nix is a 82 y.o. female who presents to the emergency department brought in by ambulance due to altered mental state and vomiting. The patient is actively vomiting at the time of arrival and initially could provide no information at all. After her vomiting subsided she was able to say that she was very nauseous and that she did not have any pain but she was really not able to provide any additional history. Family members are here and say that the family is from [place] and they have been traveling they've been in [facility] for the last 5 days and last night the patient was extremely restless her  says that she seemed confused she was unable to urinate and had multiple episodes of nausea and vomiting. Apparently the patient had an episode like this several months ago which resolved and the family is unaware of what caused this. The EMS crew apparently thought that the patient was having chest pain in the gave her aspirin and nitroglycerin prior to arrival. Her initial blood pressure was reportedly 180/110 but this did precipitously fall after she was given nitroglycerin. No additional history of present illness could be obtained from the patient    REVIEW OF SYSTEMS other than saying she has nausea and has no pain the patient can provide no other review of systems because of altered mental state    PAST MEDICAL HISTORY  Past Medical History   Diagnosis Date   • CAD (coronary artery disease)    • Hypertension    • Diabetes (CMS-[facility])    • Hyperlipemia    • Breast cancer (CMS-[facility])        FAMILY HISTORY  History reviewed. No pertinent family history.    SOCIAL HISTORY  Social History     Social History   • Marital Status:      Spouse Name: N/A   • Number of Children: N/A   • Years of Education: N/A     Social History Main Topics   • Smoking status: Never Smoker    • Smokeless tobacco: None    • Alcohol Use: No   • Drug Use: No   • Sexual Activity: Not Asked     Other Topics Concern   • None     Social History Narrative   • None       SURGICAL HISTORY  History reviewed. No pertinent past surgical history.    CURRENT MEDICATIONS  Home Medications     Reviewed by Kasia Velez (Pharmacy Tech) on 07/09/17 at 1330  Med List Status: Complete    Medication Last Dose Status    aspirin (ASA) 81 MG CHEW unknown Active    atorvastatin (LIPITOR) 40 MG Tab unknown Active    baclofen (LIORESAL) 10 MG Tab unknown Active    carvedilol (COREG) 25 MG Tab unknown Active    coenzyme Q-10 30 MG capsule unknown Active    gabapentin (NEURONTIN) 600 MG tablet unknown Active    glipiZIDE (GLUCOTROL) 10 MG TABS unknown Active    hydrALAZINE (APRESOLINE) 10 MG Tab unknown Active    isosorbide mononitrate SR (IMDUR) 30 MG TABLET SR 24 HR unknown Active    pantoprazole (PROTONIX) 20 MG tablet unknown Active                ALLERGIES  Allergies   Allergen Reactions   • Phenergan With Dextromethorphan      Phenergan - rxn: hallucinations, agitated, leg cramps, flailing arms/legs, slurred speech, blurred vision   • Citrus Diarrhea and Vomiting   • Codeine Rash   • Contrast Media With Iodine [Iodine]    • Eggs Diarrhea   • Food      Melons & walnuts - swelling mouth/throat     • Lactose    • Sulfa Drugs Rash   • Vicodin [Hydrocodone-Acetaminophen] Vomiting       PHYSICAL EXAM  VITAL SIGNS: /62 mmHg  Pulse 70  Temp(Src) 36.7 °C (98 °F)  Resp 12  Ht 1.524 m (5')  Wt 63.504 kg (140 lb)  BMI 27.34 kg/m2  SpO2 100%   Oxygen saturation is interpreted as hypoxic at 88% on room air at the time of arrival and adequate with supplemental oxygen by nasal cannula.  Constitutional: At the time of arrival the patient is actively retching producing a clear yellowish emesis and is rocking back and forth in the gurney and really unable to answer any questions  HENT: Mucous membranes are moist  Eyes: Pupils round extraocular  motion present no erythema or discharge or jaundice  Neck: Trachea midline no JVD  Cardiovascular: Regular rate and rhythm  Lungs: Clear and equal bilaterally with no apparent difficulty breathing  Abdomen/Back: Soft nondistended nontender no rebound or guarding or peritoneal findings  Skin: Warm and dry, no diaphoresis  Musculoskeletal: No acute bony deformity  Neurologic: The patient is awake she did answer some questions as described above but really could not provide an extensive history, she is moving all extremities    CHART REVIEW  The most recent data for this patient in our computer medical record is from January 6, 2013 when she was admitted for non-ST elevation myocardial infarction and the note indicates that she has a history of peripheral vascular disease as well as an area of aortic occlusion which has been evaluated by surgeons in [place] and the patient is not a candidate for surgical intervention, she has a history of diabetes and history of breast cancer    Laboratory  A CBC shows an unremarkable white blood count of 10.4 with hemoglobin adequate at 10.8, basic metabolic panel shows a slightly elevated BUN of 34 and an elevated glucose of 260, lipase is normal at 20, troponin is normal at less than 0.01 and a 2 hour repeat troponin remained normal at less than 0.01. The BNP is slightly elevated at 525    EKG interpretation  EKG #1 is a 12-lead EKG showing a sinus rhythm of 82 bpm there is a left axis deviation there is no pathologic ST elevation, there is ST depression in leads V4 and V5 and V6 with poor R-wave progression and frequent PVCs on the tracing. This cardiogram is similar to the cardiogram from January 30, 2013 except that today there are PVCs and the ST depression seen in V4 and V5 and V6 seems slightly more prominent today.    A repeat EKG was obtained and this is a 12-lead EKG showing a sinus rhythm 77 bpm there is a left axis deviation there is no pathologic ST elevation there is  persistent but less prominent ST depression in V4 V5 and V6 and there were no PVCs on the repeat tracing    Radiology  CT-ABDOMEN-PELVIS WITH   Final Result         Coarse calcified plaque in the abdominal aorta with occlusion of the infrarenal abdominal aorta due to coarse calcified plaque suggesting a chronic process with opacification of the more distal abdominal aorta apparently due to collaterals.      No evidence of bowel dilatation.      Appendix not visualized.      Enlarged, heterogeneous uterus.      DX-CHEST-PORTABLE (1 VIEW)   Final Result      Congestive heart failure.      CT-CTA CHEST PULMONARY ARTERY W/ RECONS   Final Result      No evidence of pulmonary embolus.      Pulmonary edema with trace pleural effusions.               CT-HEAD W/O   Final Result      Head CT without contrast within normal limits. No evidence of acute cerebral infarction, hemorrhage or mass lesion.      CAROTID DUPLEX    (Results Pending)   MR-BRAIN-W/O    (Results Pending)       MEDICAL DECISION MAKING and DISPOSITION  In the emergency department an IV was established and the patient was placed on a cardiac monitor and given supplemental oxygen by nasal cannula. Her initial blood pressure at the time of arrival after receiving nitroglycerin by EMS was very low and she was given intravenous fluids. Her blood pressure did recover. The patient was given repeat dosing of Zofran because she could not stop vomiting and this was not very helpful so she was given a small dose of 2 mg intravenous Haldol with 2 mg of intravenous Versed and this was much more helpful in controlling her vomiting. Because CT scans were ordered with intravenous contrast and the patient's family reported a contrast allergy the patient was pretreated with intravenous Decadron and Benadryl and underwent CT with no apparent adverse reaction at this point in time. The patient has now been sleeping for several hours, her vital signs remained stable and her  oxygen level remained in the 90s with supplemental oxygen by nasal cannula. The patient was given 20 mg of intravenous Lasix and a Butcher catheter was established. I have reviewed all the findings in detail with the family and at this point in time I do not know the cause of her nausea and vomiting or the cause of her altered mental state and the patient will require hospitalization for further evaluation and further treatment. I have reviewed the case with the hospitalist and the patient is admitted to the hospitalist service    IMPRESSION  1. Severe nausea and vomiting  2. Altered mental state  3. Hyperglycemia  4. Critical care time with this patient is 45 minutes           Electronically signed by: Yovany Murphy, 7/9/2017 2:49 PM

## 2017-07-09 NOTE — ED NOTES
Med rec complete per Pt;s family at bedside with medications  Medications identified and returned to Pt's family  Family unsure when Pt's last doses were  Allergies reviewed

## 2017-07-10 ENCOUNTER — APPOINTMENT (OUTPATIENT)
Dept: RADIOLOGY | Facility: MEDICAL CENTER | Age: 82
End: 2017-07-10
Attending: INTERNAL MEDICINE
Payer: MEDICARE

## 2017-07-10 PROBLEM — E78.5 HLD (HYPERLIPIDEMIA): Status: ACTIVE | Noted: 2017-07-10

## 2017-07-10 PROBLEM — C50.919 BREAST CANCER (HCC): Status: ACTIVE | Noted: 2017-07-10

## 2017-07-10 PROBLEM — I25.10 CAD (CORONARY ARTERY DISEASE): Status: ACTIVE | Noted: 2017-07-10

## 2017-07-10 PROBLEM — E11.9 DIABETES (HCC): Status: ACTIVE | Noted: 2017-07-10

## 2017-07-10 PROBLEM — R29.898 RIGHT LEG WEAKNESS: Status: ACTIVE | Noted: 2017-07-10

## 2017-07-10 LAB
ANION GAP SERPL CALC-SCNC: 10 MMOL/L (ref 0–11.9)
BUN SERPL-MCNC: 35 MG/DL (ref 8–22)
CALCIUM SERPL-MCNC: 8.8 MG/DL (ref 8.5–10.5)
CHLORIDE SERPL-SCNC: 104 MMOL/L (ref 96–112)
CHOLEST SERPL-MCNC: 178 MG/DL (ref 100–199)
CO2 SERPL-SCNC: 24 MMOL/L (ref 20–33)
CREAT SERPL-MCNC: 1.24 MG/DL (ref 0.5–1.4)
ERYTHROCYTE [DISTWIDTH] IN BLOOD BY AUTOMATED COUNT: 44.8 FL (ref 35.9–50)
GFR SERPL CREATININE-BSD FRML MDRD: 41 ML/MIN/1.73 M 2
GLUCOSE BLD-MCNC: 132 MG/DL (ref 65–99)
GLUCOSE BLD-MCNC: 151 MG/DL (ref 65–99)
GLUCOSE BLD-MCNC: 172 MG/DL (ref 65–99)
GLUCOSE BLD-MCNC: 194 MG/DL (ref 65–99)
GLUCOSE SERPL-MCNC: 163 MG/DL (ref 65–99)
HCT VFR BLD AUTO: 31.2 % (ref 37–47)
HDLC SERPL-MCNC: 58 MG/DL
HGB BLD-MCNC: 10.3 G/DL (ref 12–16)
LDLC SERPL CALC-MCNC: 94 MG/DL
MAGNESIUM SERPL-MCNC: 2 MG/DL (ref 1.5–2.5)
MCH RBC QN AUTO: 28.8 PG (ref 27–33)
MCHC RBC AUTO-ENTMCNC: 33 G/DL (ref 33.6–35)
MCV RBC AUTO: 87.2 FL (ref 81.4–97.8)
PLATELET # BLD AUTO: 212 K/UL (ref 164–446)
PMV BLD AUTO: 10.5 FL (ref 9–12.9)
POTASSIUM SERPL-SCNC: 3.9 MMOL/L (ref 3.6–5.5)
POTASSIUM SERPL-SCNC: 4.1 MMOL/L (ref 3.6–5.5)
RBC # BLD AUTO: 3.58 M/UL (ref 4.2–5.4)
SODIUM SERPL-SCNC: 138 MMOL/L (ref 135–145)
TRIGL SERPL-MCNC: 129 MG/DL (ref 0–149)
WBC # BLD AUTO: 10.3 K/UL (ref 4.8–10.8)

## 2017-07-10 PROCEDURE — 80048 BASIC METABOLIC PNL TOTAL CA: CPT

## 2017-07-10 PROCEDURE — 93880 EXTRACRANIAL BILAT STUDY: CPT

## 2017-07-10 PROCEDURE — 700102 HCHG RX REV CODE 250 W/ 637 OVERRIDE(OP): Performed by: INTERNAL MEDICINE

## 2017-07-10 PROCEDURE — 80061 LIPID PANEL: CPT

## 2017-07-10 PROCEDURE — G8988 SELF CARE GOAL STATUS: HCPCS | Mod: CI

## 2017-07-10 PROCEDURE — 97166 OT EVAL MOD COMPLEX 45 MIN: CPT

## 2017-07-10 PROCEDURE — 97535 SELF CARE MNGMENT TRAINING: CPT

## 2017-07-10 PROCEDURE — 96375 TX/PRO/DX INJ NEW DRUG ADDON: CPT | Mod: XU

## 2017-07-10 PROCEDURE — A9270 NON-COVERED ITEM OR SERVICE: HCPCS | Performed by: INTERNAL MEDICINE

## 2017-07-10 PROCEDURE — 93880 EXTRACRANIAL BILAT STUDY: CPT | Mod: 26 | Performed by: SURGERY

## 2017-07-10 PROCEDURE — 82962 GLUCOSE BLOOD TEST: CPT | Mod: 91

## 2017-07-10 PROCEDURE — 70551 MRI BRAIN STEM W/O DYE: CPT

## 2017-07-10 PROCEDURE — G8987 SELF CARE CURRENT STATUS: HCPCS | Mod: CK

## 2017-07-10 PROCEDURE — G0378 HOSPITAL OBSERVATION PER HR: HCPCS

## 2017-07-10 PROCEDURE — 700111 HCHG RX REV CODE 636 W/ 250 OVERRIDE (IP): Performed by: INTERNAL MEDICINE

## 2017-07-10 PROCEDURE — 36415 COLL VENOUS BLD VENIPUNCTURE: CPT

## 2017-07-10 PROCEDURE — 85027 COMPLETE CBC AUTOMATED: CPT

## 2017-07-10 PROCEDURE — 99226 PR SUBSEQUENT OBSERVATION CARE,LEVEL III: CPT | Performed by: INTERNAL MEDICINE

## 2017-07-10 PROCEDURE — 84132 ASSAY OF SERUM POTASSIUM: CPT | Mod: 59

## 2017-07-10 PROCEDURE — 83735 ASSAY OF MAGNESIUM: CPT

## 2017-07-10 RX ORDER — LORAZEPAM 2 MG/ML
0.5 INJECTION INTRAMUSCULAR ONCE
Status: COMPLETED | OUTPATIENT
Start: 2017-07-10 | End: 2017-07-10

## 2017-07-10 RX ADMIN — STANDARDIZED SENNA CONCENTRATE AND DOCUSATE SODIUM 2 TABLET: 8.6; 5 TABLET, FILM COATED ORAL at 20:04

## 2017-07-10 RX ADMIN — BACLOFEN 10 MG: 10 TABLET ORAL at 20:03

## 2017-07-10 RX ADMIN — INSULIN LISPRO 1 UNITS: 100 INJECTION, SOLUTION INTRAVENOUS; SUBCUTANEOUS at 05:48

## 2017-07-10 RX ADMIN — ASPIRIN 325 MG: 325 TABLET, COATED ORAL at 09:36

## 2017-07-10 RX ADMIN — HYDRALAZINE HYDROCHLORIDE 30 MG: 10 TABLET ORAL at 22:00

## 2017-07-10 RX ADMIN — OMEPRAZOLE 20 MG: 20 CAPSULE, DELAYED RELEASE ORAL at 09:36

## 2017-07-10 RX ADMIN — BACLOFEN 10 MG: 10 TABLET ORAL at 09:36

## 2017-07-10 RX ADMIN — GLIPIZIDE 5 MG: 5 TABLET ORAL at 19:00

## 2017-07-10 RX ADMIN — INSULIN LISPRO 1 UNITS: 100 INJECTION, SOLUTION INTRAVENOUS; SUBCUTANEOUS at 20:15

## 2017-07-10 RX ADMIN — ACETAMINOPHEN 650 MG: 325 TABLET, FILM COATED ORAL at 05:49

## 2017-07-10 RX ADMIN — GLIPIZIDE 10 MG: 5 TABLET ORAL at 09:38

## 2017-07-10 RX ADMIN — ENOXAPARIN SODIUM 40 MG: 100 INJECTION SUBCUTANEOUS at 09:36

## 2017-07-10 RX ADMIN — CARVEDILOL 25 MG: 6.25 TABLET, FILM COATED ORAL at 09:37

## 2017-07-10 RX ADMIN — HYDRALAZINE HYDROCHLORIDE 30 MG: 10 TABLET ORAL at 05:44

## 2017-07-10 RX ADMIN — ATORVASTATIN CALCIUM 80 MG: 80 TABLET, FILM COATED ORAL at 20:04

## 2017-07-10 RX ADMIN — ISOSORBIDE MONONITRATE 30 MG: 60 TABLET ORAL at 09:38

## 2017-07-10 RX ADMIN — LORAZEPAM 0.5 MG: 2 INJECTION INTRAMUSCULAR; INTRAVENOUS at 11:30

## 2017-07-10 RX ADMIN — BACLOFEN 10 MG: 10 TABLET ORAL at 16:40

## 2017-07-10 ASSESSMENT — COGNITIVE AND FUNCTIONAL STATUS - GENERAL
DAILY ACTIVITIY SCORE: 19
TOILETING: A LITTLE
PERSONAL GROOMING: A LITTLE
HELP NEEDED FOR BATHING: A LITTLE
DRESSING REGULAR UPPER BODY CLOTHING: A LITTLE
DRESSING REGULAR LOWER BODY CLOTHING: A LITTLE
SUGGESTED CMS G CODE MODIFIER DAILY ACTIVITY: CK

## 2017-07-10 ASSESSMENT — ENCOUNTER SYMPTOMS
ABDOMINAL PAIN: 0
NAUSEA: 0
DIARRHEA: 0
CONSTIPATION: 0
SPEECH CHANGE: 0
PHOTOPHOBIA: 0
VOMITING: 0
BLURRED VISION: 0
WEAKNESS: 0
NERVOUS/ANXIOUS: 0
FEVER: 0
CLAUDICATION: 0
CHILLS: 0
SENSORY CHANGE: 0
MYALGIAS: 0
HEADACHES: 0
SHORTNESS OF BREATH: 0
HEARTBURN: 0
COUGH: 0
INSOMNIA: 0
DIZZINESS: 0
DEPRESSION: 0

## 2017-07-10 ASSESSMENT — LIFESTYLE VARIABLES: DO YOU DRINK ALCOHOL: NO

## 2017-07-10 ASSESSMENT — ACTIVITIES OF DAILY LIVING (ADL): TOILETING: INDEPENDENT

## 2017-07-10 NOTE — PROGRESS NOTES
[facility] Hospitalist Progress Note    Date of Service: 7/10/2017    Chief Complaint  82 y.o. female admitted 2017 with confusion, ataxia, muscle cramping and uncontrolled n/v on presentation.     Interval Problem Update  She is feeling much better when evaluated this am, multiple family members at bedside when seen contributing to her medical presentation.  No obvious source of infection identified as normal ua and cxr.  CVA r/o work up underway.  Patient not back to her baseline but is feeling much better that when she presented yesterday.    Consultants/Specialty  none    Disposition  Home likely        Review of Systems   Constitutional: Negative for fever and chills.   HENT: Negative for congestion.    Eyes: Negative for blurred vision and photophobia.   Respiratory: Negative for cough and shortness of breath.    Cardiovascular: Negative for chest pain, claudication and leg swelling.   Gastrointestinal: Negative for heartburn, nausea (resolved), vomiting (resolved), abdominal pain, diarrhea and constipation.   Genitourinary: Negative for dysuria and hematuria.   Musculoskeletal: Negative for myalgias and joint pain.   Skin: Negative for itching and rash.   Neurological: Negative for dizziness, sensory change, speech change, weakness and headaches.   Psychiatric/Behavioral: Negative for depression. The patient is not nervous/anxious and does not have insomnia.       Physical Exam  Laboratory/Imaging   Hemodynamics  Temp (24hrs), Av.7 °C (98 °F), Min:36.3 °C (97.3 °F), Max:37.1 °C (98.8 °F)   Temperature: 37 °C (98.6 °F)  Pulse  Av.9  Min: 65  Max: 87 Heart Rate (Monitored): 74  Blood Pressure : (!) 122/36 mmHg, NIBP: 120/84 mmHg      Respiratory      Respiration: 16, Pulse Oximetry: 97 %        RUL Breath Sounds: Clear, RML Breath Sounds: Clear, RLL Breath Sounds: Diminished, IAN Breath Sounds: Clear, LLL Breath Sounds: Diminished    Fluids    Intake/Output Summary (Last 24 hours) at 07/10/17  1400  Last data filed at 07/10/17 0600   Gross per 24 hour   Intake    900 ml   Output    450 ml   Net    450 ml       Nutrition  Orders Placed This Encounter   Procedures   • Diet Order     Standing Status: Standing      Number of Occurrences: 1      Standing Expiration Date:      Order Specific Question:  Diet:     Answer:  Clear Liquid [10]     Physical Exam    Recent Labs      07/09/17   1004  07/10/17   0121   WBC  10.4  10.3   RBC  3.72*  3.58*   HEMOGLOBIN  10.8*  10.3*   HEMATOCRIT  33.3*  31.2*   MCV  89.5  87.2   MCH  29.0  28.8   MCHC  32.4*  33.0*   RDW  46.6  44.8   PLATELETCT  207  212   MPV  10.6  10.5     Recent Labs      07/09/17   1004  07/10/17   0121   SODIUM  138  138   POTASSIUM  4.3  4.1   CHLORIDE  106  104   CO2  22  24   GLUCOSE  260*  163*   BUN  34*  35*   CREATININE  1.02  1.24   CALCIUM  9.0  8.8     Recent Labs      07/09/17   1046   APTT  24.3*   INR  0.90     Recent Labs      07/09/17   1004   BNPBTYPENAT  525*     Recent Labs      07/10/17   0121   TRIGLYCERIDE  129   HDL  58   LDL  94          Assessment/Plan     * Confusion  Assessment & Plan  resolved  No evidence of infection, r/o encephalopathy d/t possible stroke      Ataxia  Assessment & Plan  R/o cva  Pt/ot evaluations      Nausea & vomiting  Assessment & Plan  Resolved with anti-emetics  Suspect likely viral infection    Right leg weakness  Assessment & Plan  Improved  MRI, echo, carotids        Breast cancer (CMS-HCC)  Assessment & Plan  H/o  No acute issues      HLD (hyperlipidemia)  Assessment & Plan  Statin      Diabetes (CMS-HCC)  Assessment & Plan  Glipizide  SSI  Diabetic diet after swallow evaluation      CAD (coronary artery disease)  Assessment & Plan  History of MI  BB, statin        Core Measures

## 2017-07-10 NOTE — PROGRESS NOTES
Attempted to page Hospitalist x2 for ativan one time order for MRI and did not receive page back. Will let day shift know.

## 2017-07-10 NOTE — PROGRESS NOTES
Monitor Summary: SR 71-83, RI .20, QRS .10, QT .44 with bigem & occasional PVC per strip from monitor room

## 2017-07-10 NOTE — CARE PLAN
Problem: Safety  Goal: Will remain free from falls  Outcome: PROGRESSING AS EXPECTED  Bed alarm on, fall precautions in place     Problem: Pain Management  Goal: Pain level will decrease to patient’s comfort goal  Outcome: PROGRESSING SLOWER THAN EXPECTED  Pt c/o of intermittent leg cramps and pain. Medicated per MAR

## 2017-07-10 NOTE — PROGRESS NOTES
Pt continuing to have leg cramps despite heat packs, tylenol, and clonazepam 0.5 x1. Hospitalist informed and states okay to resume home baclofen dose. Baclofen given to patient with positive results.

## 2017-07-10 NOTE — PROGRESS NOTES
Pt AAOX2, just to self and place. Speech is repetitive and confused. SWANSON. C/o of leg cramping, heat packs and tylenol given. Family at bedside. Tolerating clear liquid diet. Butcher catheter in place MRI unable to be done tonight. Will plan for tomorrow. Bed alarm on call light in reach.

## 2017-07-11 ENCOUNTER — PATIENT OUTREACH (OUTPATIENT)
Dept: HEALTH INFORMATION MANAGEMENT | Facility: OTHER | Age: 82
End: 2017-07-11

## 2017-07-11 VITALS
BODY MASS INDEX: 27.48 KG/M2 | WEIGHT: 140 LBS | OXYGEN SATURATION: 94 % | RESPIRATION RATE: 16 BRPM | SYSTOLIC BLOOD PRESSURE: 152 MMHG | HEIGHT: 60 IN | TEMPERATURE: 97.7 F | HEART RATE: 85 BPM | DIASTOLIC BLOOD PRESSURE: 58 MMHG

## 2017-07-11 PROBLEM — R27.0 ATAXIA: Status: RESOLVED | Noted: 2017-07-09 | Resolved: 2017-07-11

## 2017-07-11 PROBLEM — R11.2 NAUSEA & VOMITING: Status: RESOLVED | Noted: 2017-07-09 | Resolved: 2017-07-11

## 2017-07-11 PROBLEM — R41.0 CONFUSION: Status: RESOLVED | Noted: 2017-07-09 | Resolved: 2017-07-11

## 2017-07-11 PROBLEM — R29.898 RIGHT LEG WEAKNESS: Status: RESOLVED | Noted: 2017-07-10 | Resolved: 2017-07-11

## 2017-07-11 LAB
GLUCOSE BLD-MCNC: 135 MG/DL (ref 65–99)
GLUCOSE BLD-MCNC: 212 MG/DL (ref 65–99)
GLUCOSE BLD-MCNC: 69 MG/DL (ref 65–99)
GLUCOSE BLD-MCNC: 77 MG/DL (ref 65–99)
LV EJECT FRACT MOD 2C 99903: 35.97
LV EJECT FRACT MOD 4C 99902: 45.4
LV EJECT FRACT MOD BP 99901: 41.65

## 2017-07-11 PROCEDURE — G0378 HOSPITAL OBSERVATION PER HR: HCPCS

## 2017-07-11 PROCEDURE — 99217 PR OBSERVATION CARE DISCHARGE: CPT | Performed by: HOSPITALIST

## 2017-07-11 PROCEDURE — 93010 ELECTROCARDIOGRAM REPORT: CPT | Performed by: INTERNAL MEDICINE

## 2017-07-11 PROCEDURE — 93306 TTE W/DOPPLER COMPLETE: CPT

## 2017-07-11 PROCEDURE — A9270 NON-COVERED ITEM OR SERVICE: HCPCS | Performed by: INTERNAL MEDICINE

## 2017-07-11 PROCEDURE — 700102 HCHG RX REV CODE 250 W/ 637 OVERRIDE(OP): Performed by: INTERNAL MEDICINE

## 2017-07-11 PROCEDURE — 93005 ELECTROCARDIOGRAM TRACING: CPT | Performed by: HOSPITALIST

## 2017-07-11 PROCEDURE — 700111 HCHG RX REV CODE 636 W/ 250 OVERRIDE (IP): Performed by: INTERNAL MEDICINE

## 2017-07-11 PROCEDURE — 93306 TTE W/DOPPLER COMPLETE: CPT | Mod: 26 | Performed by: INTERNAL MEDICINE

## 2017-07-11 PROCEDURE — 82962 GLUCOSE BLOOD TEST: CPT | Mod: 91

## 2017-07-11 RX ORDER — ONDANSETRON 4 MG/1
4 TABLET, ORALLY DISINTEGRATING ORAL ONCE
Status: DISCONTINUED | OUTPATIENT
Start: 2017-07-11 | End: 2017-07-11 | Stop reason: HOSPADM

## 2017-07-11 RX ORDER — ONDANSETRON 4 MG/1
4 TABLET, ORALLY DISINTEGRATING ORAL EVERY 8 HOURS PRN
Qty: 15 TAB | Refills: 0 | Status: SHIPPED | OUTPATIENT
Start: 2017-07-11 | End: 2017-07-16

## 2017-07-11 RX ADMIN — ASPIRIN 325 MG: 325 TABLET, COATED ORAL at 08:55

## 2017-07-11 RX ADMIN — STANDARDIZED SENNA CONCENTRATE AND DOCUSATE SODIUM 2 TABLET: 8.6; 5 TABLET, FILM COATED ORAL at 09:00

## 2017-07-11 RX ADMIN — CARVEDILOL 25 MG: 6.25 TABLET, FILM COATED ORAL at 16:57

## 2017-07-11 RX ADMIN — BACLOFEN 10 MG: 10 TABLET ORAL at 08:55

## 2017-07-11 RX ADMIN — ENOXAPARIN SODIUM 40 MG: 100 INJECTION SUBCUTANEOUS at 09:00

## 2017-07-11 RX ADMIN — CARVEDILOL 25 MG: 6.25 TABLET, FILM COATED ORAL at 08:55

## 2017-07-11 RX ADMIN — GLIPIZIDE 10 MG: 5 TABLET ORAL at 08:55

## 2017-07-11 RX ADMIN — OMEPRAZOLE 20 MG: 20 CAPSULE, DELAYED RELEASE ORAL at 08:55

## 2017-07-11 RX ADMIN — HYDRALAZINE HYDROCHLORIDE 30 MG: 10 TABLET ORAL at 15:00

## 2017-07-11 RX ADMIN — ISOSORBIDE MONONITRATE 30 MG: 60 TABLET ORAL at 08:55

## 2017-07-11 RX ADMIN — INSULIN LISPRO 2 UNITS: 100 INJECTION, SOLUTION INTRAVENOUS; SUBCUTANEOUS at 12:26

## 2017-07-11 RX ADMIN — HYDRALAZINE HYDROCHLORIDE 30 MG: 10 TABLET ORAL at 06:11

## 2017-07-11 RX ADMIN — BACLOFEN 10 MG: 10 TABLET ORAL at 16:57

## 2017-07-11 NOTE — PROGRESS NOTES
Pt now AAOX4, almost back to baseline. Speech is more fluent and appropriate. No longer restless and complaining of muscle cramps. Family at bedside. Educated on plan of care and discharge planning. Family expressed interest in receiving diabetes education, will pass on to day shift. Up with x1 assist with walker. Tolerating regular diet. Tele monitor in use. Butcher in place, draining to gravity. Call light in reach.

## 2017-07-11 NOTE — PROGRESS NOTES
Monitor summary: SR 68-89, MA 0.20, QRS 0.12, QT 0.38, with frequent PVCs, frequent bigeminy, rare couplets, Occasional/frequent PACs and 21 beats of V-tach per strip from monitor room.

## 2017-07-11 NOTE — THERAPY
"Occupational Therapy Evaluation completed.   Functional Status:  Pt pleasant with a large family for support.  Pt required Min A for supine to sit EOB.  Pt able to don shirt with CGA.  Pt donned hospital pants with Min A & was instructed how to use a sock aid to don socks with V/Q's.  Pt's granddaughter ordered the pt one for home use.  Pt was SBA for sit-stand & amb to bathroom with FWW & CGA.  Pt left up in chair for dinner with her family present.    Plan of Care: Will benefit from Occupational Therapy 3 times per week  Discharge Recommendations:  Equipment: No Equipment Needed. Post-acute therapy Discharge to home with outpatient or home health for additional skilled therapy services.    See \"Rehab Therapy-Acute\" Patient Summary Report for complete documentation.    "

## 2017-07-11 NOTE — DISCHARGE INSTRUCTIONS
Discharge Instructions    Discharged to home by car with relative. Discharged via wheelchair, hospital escort: Yes.  Special equipment needed: Not Applicable    Be sure to schedule a follow-up appointment with your primary care doctor or any specialists as instructed.     Discharge Plan:   Influenza Vaccine Indication: Not indicated: Previously immunized this influenza season and > 8 years of age    I understand that a diet low in cholesterol, fat, and sodium is recommended for good health. Unless I have been given specific instructions below for another diet, I accept this instruction as my diet prescription.   Other diet: Diabetic      Special Instructions: None    · Is patient discharged on Warfarin / Coumadin?   No     · Is patient Post Blood Transfusion?  No    Depression / Suicide Risk    As you are discharged from this [facility] facility, it is important to learn how to keep safe from harming yourself.    Recognize the warning signs:  · Abrupt changes in personality, positive or negative- including increase in energy   · Giving away possessions  · Change in eating patterns- significant weight changes-  positive or negative  · Change in sleeping patterns- unable to sleep or sleeping all the time   · Unwillingness or inability to communicate  · Depression  · Unusual sadness, discouragement and loneliness  · Talk of wanting to die  · Neglect of personal appearance   · Rebelliousness- reckless behavior  · Withdrawal from people/activities they love  · Confusion- inability to concentrate     If you or a loved one observes any of these behaviors or has concerns about self-harm, here's what you can do:  · Talk about it- your feelings and reasons for harming yourself  · Remove any means that you might use to hurt yourself (examples: pills, rope, extension cords, firearm)  · Get professional help from the community (Mental Health, Substance Abuse, psychological counseling)  · Do not be alone:Call your Safe  Contact- someone whom you trust who will be there for you.  · Call your local CRISIS HOTLINE 911-6148 or 698-916-5555  · Call your local Children's Mobile Crisis Response Team [place] (643) 139-1784 or www.WorkThink  · Call the toll free National Suicide Prevention Hotlines   · National Suicide Prevention Lifeline 413-088-OHAM (6338)  · National [employer] Network 800-SUICIDE (289-3395)

## 2017-07-11 NOTE — CARE PLAN
Problem: Skin Integrity  Goal: Risk for impaired skin integrity will decrease  Outcome: PROGRESSING AS EXPECTED  Pt turns self side to side     Problem: Urinary Elimination:  Goal: Ability to reestablish a normal urinary elimination pattern will improve  Outcome: PROGRESSING SLOWER THAN EXPECTED  Butcher catheter in place for retention

## 2017-07-11 NOTE — PROGRESS NOTES
Tele called saying pt had 21 beats of vtach. Pt currently stable, denies chest pain. VSS normal. Hospitalist updated. Received order for stat magnesium and potassium.

## 2017-07-11 NOTE — PROGRESS NOTES
No acute issue to report during my care, patient ambulates to the BR x1 with walker. No complaints of pain, mild nausea. Gave 0.5mg of ativan before Brain MRI. No issues or general concerns to report during my care. Agata Guthrie RN

## 2017-07-11 NOTE — DISCHARGE SUMMARY
CHIEF COMPLAINT ON ADMISSION  Chief Complaint   Patient presents with   • ALOC       CODE STATUS  DNR    HPI & HOSPITAL COURSE  This is a 82 y.o. female here with altered mental status with nausea vomiting on admission. As per  patient was apparently confused and had nausea vomiting as a result she was brought to ER for further diagnostics. On admission her blood pressure was 180/110, in addition to elevated glucose of 260. Since admission patient received a CT of her head which was negative for any acute intracranial processes. In addition carotid duplex as well as an MRI of the brain was done, with only positive result of moderate bilateral cervical internal carotid stenosis, and an MRI of the brain which showed small chronic my complete in the right frontal coronal radiata. Consents with supportive therapy IV fluids and IV antiemetics patient's mentation did improve back to baseline. In addition echocardiogram was not performed as her last echo cardiogram 2006 from [facility] showed LVEF of 55% she was apparently due for another echocardiogram this month.   At this point patient is alert oriented ×3 and has a very short memory. We have recommended her to continue her aspirin as well as statin for neuro protective measures. Patient at this point denies having vision changes headache chest pain shortness of breath or nausea vomiting.    Of additional note to assist the physicians at [facility]. We have recommended the patient to obtain ABIs as an outpatient. As from prior angiography from the [facility] computer system appears that the patient does have peripheral vascular disease. Given that she does complain of intermittent lower extremity bilateral cramps this could certainly be considered. In addition patient is due for another echocardiogram and that has not been repeated on this admission.  I did  patient heavily on the importance of eating small meals and making sure that  if she does take her glipizide that she actually eats. In addition I recommended close log of her blood sugars.      Therefore, she is discharged in good and stable condition with close outpatient follow-up.    SPECIFIC OUTPATIENT FOLLOW  PCP     DISCHARGE PROBLEM LIST  Principal Problem (Resolved):    Confusion POA: Unknown  Active Problems:    Breast cancer (CMS-HCC) POA: Unknown    HLD (hyperlipidemia) POA: Unknown    Diabetes (CMS-HCC) POA: Unknown    CAD (coronary artery disease) POA: Unknown  Resolved Problems:    Ataxia POA: Unknown    Nausea & vomiting POA: Unknown    Right leg weakness POA: Unknown      FOLLOW UP  No future appointments.  No follow-up provider specified.    MEDICATIONS ON DISCHARGE   Yi Nix   Home Medication Instructions ALIZA:[id number]    Printed on:07/11/17 1236   Medication Information                      aspirin (ASA) 81 MG Chew Tab chewable tablet  Take 81 mg by mouth every day.             atorvastatin (LIPITOR) 40 MG Tab  Take 10 mg by mouth every day.             baclofen (LIORESAL) 10 MG Tab  Take 10 mg by mouth 3 times a day.             carvedilol (COREG) 25 MG Tab  Take 25 mg by mouth 2 times a day, with meals.             coenzyme Q-10 30 MG capsule  Take 30 mg by mouth every day.             gabapentin (NEURONTIN) 600 MG tablet  Take 600 mg by mouth 3 times a day.             glipiZIDE (GLUCOTROL) 10 MG TABS  Take 5-10 mg by mouth 2 times a day. 10 mg AM  5 mg PM             hydrALAZINE (APRESOLINE) 10 MG Tab  Take 30 mg by mouth 3 times a day.             isosorbide mononitrate SR (IMDUR) 30 MG TABLET SR 24 HR  Take 30 mg by mouth every morning.             pantoprazole (PROTONIX) 20 MG tablet  Take 20 mg by mouth every day.                 DIET  Orders Placed This Encounter   Procedures   • Diet Order     Standing Status: Standing      Number of Occurrences: 1      Standing Expiration Date:      Order Specific Question:  Diet:     Answer:  Diabetic [3]       ACTIVITY  As  tolerated.    CONSULTATIONS  None    PROCEDURES  None    LABORATORY  Lab Results   Component Value Date/Time    SODIUM 138 07/10/2017 01:21 AM    POTASSIUM 3.9 07/10/2017 09:43 PM    CHLORIDE 104 07/10/2017 01:21 AM    CO2 24 07/10/2017 01:21 AM    GLUCOSE 163* 07/10/2017 01:21 AM    BUN 35* 07/10/2017 01:21 AM    CREATININE 1.24 07/10/2017 01:21 AM        Lab Results   Component Value Date/Time    WBC 10.3 07/10/2017 01:21 AM    HEMOGLOBIN 10.3* 07/10/2017 01:21 AM    HEMATOCRIT 31.2* 07/10/2017 01:21 AM    PLATELET COUNT 212 07/10/2017 01:21 AM        Total time of the discharge process exceeds 36 minutes

## 2017-07-11 NOTE — PROGRESS NOTES
Monitor summary: SR 63-81, MT .20, QRS .10, QT .44, with frequent PVCs and per strip from monitor room.

## 2017-07-11 NOTE — DIETARY
Nutrition Services: PT request to speak with Dietitian, upset about some foods that are being sent on Diabetic diet. Per ADL, PO low at <25-50%. Discussed Diabetic diet restrictions, food options available, and provided resources for follow up so pt can get foods she requested. Placed dinner order so pt would get a dinner she enjoys and noted preferences for snacks. Family provided number for diet clerk office for future requests. Reviewed allergies with pt: celery, walnut, strawberry cause top of mouth to be irritated, all melon causes lips to swell, hoarse throat, Lactose causes GI upset. Apples cause itching, hoarse throat, and indigestion. Pt also explains celery cause itching in ear.     Pertinent Labs (7/10): am Glu 163, BUN 35. HbA1c= 7.8 on 7/9.   Pertinent Meds: Glipizide, SSI, Imdur, Zofran PRN, Bowel protocol.   Fluids: NS @ 75 ml/hr.    Plan/Rec: Noted pt preferences. Added snacks per pt request. Noted food allergies and communicated this to kitchen. RD to follow PO intake.

## 2017-07-12 ENCOUNTER — PATIENT OUTREACH (OUTPATIENT)
Dept: HEALTH INFORMATION MANAGEMENT | Facility: OTHER | Age: 82
End: 2017-07-12

## 2017-07-12 LAB — EKG IMPRESSION: NORMAL

## 2017-07-12 NOTE — PROGRESS NOTES
Pt d/c'd home per md order.  Pt and family in agreement for pt to go home.  Discussed d/c instructions with pt, pt's , Son, and granddaughter.  Prescriptions given to family.  IV removed. Transported pt out with wheelchair.

## 2017-07-12 NOTE — PROGRESS NOTES
Dr. Dawkins looked over STAT EKG, stated that there wasn't any change from previous ekg.  Wants pt to go for a walk around unit to make sure pt has no symptoms such as dizziness.

## 2017-07-12 NOTE — PROGRESS NOTES
Monitor room called  And said pt may have had a BBB and SVT in 160'2. Pt asymptomatic.  Dr. Dawkins made aware.  Dr. PABLO ordered an EKG STAT.

## 2017-07-12 NOTE — PROGRESS NOTES
Pt ambulated around unit with CNA and walker.  Pt states she feels fine. No dizziness, or lightheadness.

## 2017-07-12 NOTE — PROGRESS NOTES
Monitor room called and said pt having continuous bigmeny.  Dr. Dawkins made aware.  Discussed last nights 20 beats of vtach as well.  No new orders.     Pt and family requesting to speak with a diabetic educator.  Pt has been a diabetic but family feel that pt still needs more education oswaldo regarding diet.  Dr. Dawkins aware, Diabetic education consult ordered.

## 2017-07-14 LAB
BACTERIA BLD CULT: NORMAL
BACTERIA BLD CULT: NORMAL
SIGNIFICANT IND 70042: NORMAL
SIGNIFICANT IND 70042: NORMAL
SITE SITE: NORMAL
SITE SITE: NORMAL
SOURCE SOURCE: NORMAL
SOURCE SOURCE: NORMAL

## 2017-07-15 LAB
EKG IMPRESSION: NORMAL
EKG IMPRESSION: NORMAL